# Patient Record
(demographics unavailable — no encounter records)

---

## 2017-04-22 NOTE — ERD
ER Documentation


Chief Complaint


Date/Time


DATE: 4/22/17


Chief Complaint


Asthma exacerbation





HPI


The patient is a 22-year-old male with a history of asthma who presents to the 

Emergency Department with complaint of asthma exacerbation. The patient reports 

that over the past four days he has been experiencing a flare of his asthma. He 

notes that this often occurs with changes in the temperature, which has been 

occurring. He has noted increased wheezing over the past several days, 

particularly at night.  The patient has increased his ProAir HFA usage, though 

does not feel that it is resolving his symptoms. He reports mild nasal 

congestion and shortness of breath at this time. Otherwise, denies cough, ear 

pain, sore throat, neck pain, neck stiffness or new rashes. Denies fevers, 

sweats, chills. Denies chest pain or palpitations. Denies recent travel, lower 

extremity edema, calf swelling/tenderness. Denies any sick contacts with 

similar symptoms.





ROS


All systems reviewed and are negative except as per history of present illness.





Medications


Home Meds


Active Scripts


Albuterol Sulfate* (Proair HFA*) 8.5 Gm Hfa.aer.ad, 2 PUFF INH Q4, #1 INHALER


   Prov:ISAAC LUX PA-C         4/22/17


Prednisone* (Prednisone*) 20 Mg Tab, 60 MG PO DAILY for 5 Days, TAB


   Prov:ISAAC LUX PA-C         4/22/17


Albuterol Sulfate* (Proair HFA*) 8.5 Gm Hfa.aer.ad, 2 PUFF INH Q4, #1 INHALER


   Prov:ANKIT BARNHART PA-C         1/31/17


Naproxen* (Naprosyn*) 500 Mg Tablet, 500 MG PO BID Y for PAIN AND/OR 

INFLAMMATION, #20 TAB


   Prov:ANKIT BARNHART PA-C         1/31/17


Prednisone* (Prednisone*) 20 Mg Tab, 40 MG PO DAILY for 5 Days, TAB


   Prov:ANKIT BARNHART PA-C         1/31/17


Acyclovir* (Acyclovir*) 400 Mg Tablet, 400 MG PO QID for 7 Days, TAB


   Prov:ANKIT BARNHART PA-C         1/31/17





Allergies


Allergies:  


Coded Allergies:  


     No Known Allergy (Unverified , 4/22/17)





PMhx/Soc


Medical and Surgical Hx:  pt denies Surgical Hx


History of Surgery:  No


Anesthesia Reaction:  No


Hx Neurological Disorder:  No


Hx Respiratory Disorders:  Yes (asthma)


Hx Cardiac Disorders:  No


Hx Psychiatric Problems:  No


Hx Miscellaneous Medical Probl:  No


Hx Alcohol Use:  Yes (socially)


Hx Substance Use:  No


Hx Tobacco Use:  No


Smoking Status:  Never smoker





Physical Exam


Vitals





Vital Signs








  Date Time  Temp Pulse Resp B/P Pulse Ox O2 Delivery O2 Flow Rate FiO2


 


4/22/17 14:47 97.9 97 16 124/78 97 Room Air  


 


4/22/17 12:40  95 18  97   21


 


4/22/17 11:30 98.1 99 18 129/74 95   








Physical Exam


GENERAL: Well-developed, well-nourished, in no acute distress


HEENT: Head is normocephalic, atraumatic. No scleral pallor or icterus. Pupils 

equal, round and reactive to light. Extraocular movements intact. Conjunctiva 

pink. Nares are patent bilaterally. Bilaterally tympanic membranes are clear 

with no evidence of erythema, effusion or dulling of the light reflex. Moist 

mucous membranes. No pharyngeal erythema or exudates. Uvula is midline.  


NECK: Supple. No masses, no tenderness, no lymphadenopathy. Trachea midline. No 

nuchal rigidity. Full range of motion.


RESPIRATORY: Diffuse wheezing throughout bilateral lung fields. Prolonged 

expiratory phase. No accessory muscle use. Speaking in full sentences. No 

stridor. No rales or rhonchi. No nasal flaring. No retractions. 


CARDIOVASCULAR:  Regular rate and rhythm. S1 and S2 normal.


EXTREMITIES: No clubbing, cyanosis, or edema. Normal skin perfusion. Full range 

of motion of both the upper and lower extremities bilaterally. Muscle tone is 

normal.  No focal swelling or erythema. Distal pulses are palpable, 2+ 

bilaterally. Capillary refill is less than 2 seconds. No calf swelling/

tenderness.


NEUROLOGIC: The patient is alert, awake, and oriented x 3. No focal neurologic 

deficits.  


INTEGUMENT:  Skin is clean, dry and intact. No rashes, lesions or petechiae 

present.  


PSYCHIATRIC:  Appropriate; Cooperative.


Results 24 hrs





 Current Medications








 Medications


  (Trade)  Dose


 Ordered  Sig/Joey


 Route


 PRN Reason  Start Time


 Stop Time Status Last Admin


Dose Admin


 


 Albuterol


  (Proventil 0.5%


  (Neb))  10 mg  ONCE  STAT


 INH


   4/22/17 12:16


 4/22/17 12:18 DC 4/22/17 12:36


 


 


 Ipratropium


 Bromide


  (Atrovent 0.02%


  (Neb))  1 mg  ONCE  STAT


 IN[facility]


   4/22/17 12:16


 4/22/17 12:18 DC 4/22/17 12:36


 


 


 Methylprednisolone


 Sodium Succinate


  (Solu-Medrol)  125 mg  ONCE  ONCE


 IM


   4/22/17 12:30


 4/22/17 12:31 DC 4/22/17 12:34


 











Procedures/MDM


DIAGNOSTIC TESTS AND INTERPRETATION:


PROCEDURE:   Chest x-ray 


CLINICAL INDICATION:   Asthma exacerbation


TECHNIQUE:   Chest single view


COMPARISON:   None 


FINDINGS:The heart is normal in size.  The pulmonary vessels are normal in 

caliber.  The lungs are clear.  The costophrenic angles are sharp.  The 

visualized bony thorax is unremarkable. 


IMPRESSION:No acute cardiopulmonary disease. 


_____________________________________________ 


.French Alaniz MD, MD           Date    Time 


Electronically viewed and signed by .French Alaniz MD, MD on 04/22/2017 14:21 





MEDICAL DECISION MAKING: This is a 22-year-old male presenting to the emergency 

department with acute asthma exacerbation. He was given albuterol 10 mg as well 

as Atrovent 1 mg by nebulizer treatment. Solu-Medrol 125 mg IM was also 

administered. After rest, a period of observation, medication and breathing 

treatment, the patient had resolution of his wheezing, and felt significantly 

better. His lungs are now clear to auscultation bilaterally, with no rales, 

rhonchi or wheezing. No intercostal retractions, nasal flaring, accessory 

muscle use or signs of respiratory distress. There is no current evidence of 

pneumonia, acute coronary syndrome, pulmonary embolism, acute respiratory 

distress syndrome, status asthmaticus, sinusitis, otitis media, otitis externa, 

pharyngitis, airway obstruction, anaphylaxis, pneumothorax, acute/surgical 

abdomen, sepsis, dehydration or meningitis. At this time, the patient is in 

stable condition, and no longer experiencing any wheezing or shortness of breath

, and therefore can be discharged home with prescriptions for a short course of 

prednisone and ProAir HFA. He is advised to follow-up with his primary care 

provider for reevaluation and further management within 2-3 days, or return to 

the ER sooner for any new or worsening symptoms. I shared my medical decision 

making and plan with the patient at length and in great detail, and he verbally 

understands and agrees with the plan for further observation and care as an 

outpatient. At the time of discharge, all questions were answered.





Departure


Diagnosis:  


 Primary Impression:  


 Asthma with acute exacerbation


 Asthma severity:  unspecified severity  Qualified Code:  J45.901 - Asthma with 

acute exacerbation, unspecified asthma severity


Condition:  Stable


Patient Instructions:  Asthma, Acute (Adult)





Additional Instructions:  


Call your primary care doctor TOMORROW for an appointment during the next 2-3 

days.See the doctor sooner or return here if your condition worsens before your 

appointment time.











ISAAC LUX PA-C Apr 22, 2017 14:33

## 2017-04-22 NOTE — RADRPT
PROCEDURE:   Chest x-ray 

 

CLINICAL INDICATION:   Asthma exacerbation

 

TECHNIQUE:   Chest single view

 

COMPARISON:   None 

 

FINDINGS:

The heart is normal in size.  The pulmonary vessels are normal in caliber.  The lungs are clear.  Th
e costophrenic angles are sharp.  The visualized bony thorax is unremarkable. 

 

IMPRESSION:

 

No acute cardiopulmonary disease. 

 

 

RPTAT: HH

_____________________________________________ 

.French Alaniz MD, MD           Date    Time 

Electronically viewed and signed by .French Alaniz MD, MD on 04/22/2017 14:21 

 

D:  04/22/2017 14:21  T:  04/22/2017 14:21

.W/